# Patient Record
Sex: FEMALE | URBAN - METROPOLITAN AREA
[De-identification: names, ages, dates, MRNs, and addresses within clinical notes are randomized per-mention and may not be internally consistent; named-entity substitution may affect disease eponyms.]

---

## 2017-02-13 ENCOUNTER — APPOINTMENT (RX ONLY)
Dept: URBAN - METROPOLITAN AREA CLINIC 128 | Facility: CLINIC | Age: 67
Setting detail: DERMATOLOGY
End: 2017-02-13

## 2017-02-13 DIAGNOSIS — L82.0 INFLAMED SEBORRHEIC KERATOSIS: ICD-10-CM

## 2017-02-13 DIAGNOSIS — L82.1 OTHER SEBORRHEIC KERATOSIS: ICD-10-CM

## 2017-02-13 DIAGNOSIS — Z85.828 PERSONAL HISTORY OF OTHER MALIGNANT NEOPLASM OF SKIN: ICD-10-CM

## 2017-02-13 PROBLEM — L55.1 SUNBURN OF SECOND DEGREE: Status: ACTIVE | Noted: 2017-02-13

## 2017-02-13 PROCEDURE — ? COUNSELING

## 2017-02-13 PROCEDURE — 99203 OFFICE O/P NEW LOW 30 MIN: CPT | Mod: 25

## 2017-02-13 PROCEDURE — 17110 DESTRUCTION B9 LES UP TO 14: CPT

## 2017-02-13 PROCEDURE — ? LIQUID NITROGEN

## 2017-02-13 ASSESSMENT — LOCATION DETAILED DESCRIPTION DERM
LOCATION DETAILED: LEFT INFERIOR LATERAL MALAR CHEEK
LOCATION DETAILED: SUBXIPHOID

## 2017-02-13 ASSESSMENT — LOCATION ZONE DERM
LOCATION ZONE: FACE
LOCATION ZONE: TRUNK

## 2017-02-13 ASSESSMENT — LOCATION SIMPLE DESCRIPTION DERM
LOCATION SIMPLE: ABDOMEN
LOCATION SIMPLE: LEFT CHEEK

## 2017-02-13 NOTE — PROCEDURE: LIQUID NITROGEN
Post-Care Instructions: I reviewed with the patient in detail post-care instructions. Patient is to wear sunprotection, and avoid picking at any of the treated lesions. Pt may apply Vaseline to crusted or scabbing areas.
Medical Necessity Clause: Medically necessary to remove because
Include Z78.9 (Other Specified Conditions Influencing Health Status) As An Associated Diagnosis?: No
Medical Necessity Information: It is in your best interest to select a reason for this procedure from the list below. All of these items fulfill various CMS LCD requirements except the new and changing color options.
Number Of Freeze-Thaw Cycles: 2 freeze-thaw cycles
Duration Of Freeze Thaw-Cycle (Seconds): 0
Consent: The patient's consent was obtained including but not limited to risks of crusting, scabbing, blistering, scarring, darker or lighter pigmentary change, recurrence, incomplete removal and infection. Patient is aware that treatment may be denied by their insurance and has consented to continue with procedure.
Detail Level: Simple

## 2017-02-13 NOTE — PROCEDURE: MIPS QUALITY
Quality 130: Documentation Of Current Medications In The Medical Record: Current Medications Documented
Quality 134: Screening For Clinical Depression And Follow-Up Plan: The patient was screened for depression and the screen was negative and no follow up required
Quality 47: Advance Care Plan: Advance Care Planning discussed and documented; advance care plan or surrogate decision maker documented in the medical record.
Quality 154 Part A: Falls: Risk Assessment (Should Be Reported With Measure 155.): Falls risk assessment completed and documented in the past 12 months.
Quality 431: Preventive Care And Screening: Unhealthy Alcohol Use - Screening: Patient screened for unhealthy alcohol use using a single question and scores less than 2 times per year
Quality 111:Pneumonia Vaccination Status For Older Adults: Pneumococcal Vaccination Previously Received
Detail Level: Detailed
Quality 131: Pain Assessment And Follow-Up: Pain assessment documented as positive using a standardized tool AND a follow-up plan is documented
Quality 226: Preventive Care And Screening: Tobacco Use: Screening And Cessation Intervention: Patient screened for tobacco and never smoked
Quality 394b: Td/Tdap Immunizations For Adolescents: Patient had one tetanus, diphtheria toxoids and acellular pertussis vaccine (Tdap) on or between the patient's 10th and 13th birthdays.
Quality 110: Preventive Care And Screening: Influenza Immunization: Influenza Immunization Administered during Influenza season

## 2017-02-13 NOTE — HPI: EVALUATION OF SKIN LESION(S)
How Severe Are Your Spot(S)?: mild
Have Your Spot(S) Been Treated In The Past?: has not been treated
Hpi Title: Evaluation of Skin Lesions
Family Member: Brother

## 2017-03-03 ENCOUNTER — FOLLOW UP (OUTPATIENT)
Dept: URBAN - METROPOLITAN AREA CLINIC 33 | Facility: CLINIC | Age: 67
End: 2017-03-03

## 2017-03-03 VITALS
BODY MASS INDEX: 22.5 KG/M2 | DIASTOLIC BLOOD PRESSURE: 70 MMHG | HEIGHT: 66 IN | WEIGHT: 140 LBS | SYSTOLIC BLOOD PRESSURE: 97 MMHG | HEART RATE: 75 BPM

## 2017-03-03 DIAGNOSIS — H02.833: ICD-10-CM

## 2017-03-03 DIAGNOSIS — H04.123: ICD-10-CM

## 2017-03-03 DIAGNOSIS — H35.3132: ICD-10-CM

## 2017-03-03 DIAGNOSIS — H25.13: ICD-10-CM

## 2017-03-03 DIAGNOSIS — H02.836: ICD-10-CM

## 2017-03-03 DIAGNOSIS — H43.392: ICD-10-CM

## 2017-03-03 DIAGNOSIS — H43.811: ICD-10-CM

## 2017-03-03 PROCEDURE — G8420 CALC BMI NORM PARAMETERS: HCPCS

## 2017-03-03 PROCEDURE — 92235 FLUORESCEIN ANGRPH MLTIFRAME: CPT

## 2017-03-03 PROCEDURE — 92250 FUNDUS PHOTOGRAPHY W/I&R: CPT

## 2017-03-03 PROCEDURE — 92014 COMPRE OPH EXAM EST PT 1/>: CPT

## 2017-03-03 PROCEDURE — 2019F DILATED MACUL EXAM DONE: CPT

## 2017-03-03 PROCEDURE — 4177F TALK PT/CRGVR RE AREDS PREV: CPT

## 2017-03-03 PROCEDURE — 1036F TOBACCO NON-USER: CPT

## 2017-03-03 ASSESSMENT — TONOMETRY
OD_IOP_MMHG: 17
OS_IOP_MMHG: 14

## 2017-03-03 ASSESSMENT — VISUAL ACUITY
OD_SC: 20/20
OS_PH: 20/25-1
OS_SC: 20/80-1

## 2018-03-02 ENCOUNTER — FOLLOW UP (OUTPATIENT)
Dept: URBAN - METROPOLITAN AREA CLINIC 33 | Facility: CLINIC | Age: 68
End: 2018-03-02

## 2018-03-02 VITALS
SYSTOLIC BLOOD PRESSURE: 111 MMHG | WEIGHT: 142 LBS | HEART RATE: 75 BPM | BODY MASS INDEX: 22.82 KG/M2 | DIASTOLIC BLOOD PRESSURE: 69 MMHG | HEIGHT: 66 IN

## 2018-03-02 DIAGNOSIS — H43.392: ICD-10-CM

## 2018-03-02 DIAGNOSIS — H35.3132: ICD-10-CM

## 2018-03-02 DIAGNOSIS — H43.811: ICD-10-CM

## 2018-03-02 PROCEDURE — 92235 FLUORESCEIN ANGRPH MLTIFRAME: CPT

## 2018-03-02 PROCEDURE — 92250 FUNDUS PHOTOGRAPHY W/I&R: CPT

## 2018-03-02 PROCEDURE — 92014 COMPRE OPH EXAM EST PT 1/>: CPT

## 2018-03-02 ASSESSMENT — TONOMETRY
OS_IOP_MMHG: 14
OD_IOP_MMHG: 14

## 2018-03-02 ASSESSMENT — VISUAL ACUITY
OD_SC: 20/20-
OS_PH: 20/30-
OS_SC: 20/100

## 2018-07-18 NOTE — PATIENT DISCUSSION
POSSILBE OCULAR MIGRANE- REC. MONITOR, IF NOTICING ANY [facility] CHANGES , TOLD TO CALL OFFICE IMMEDIATELY. OFFER APPT WITH DR Niki Collins FOR THE STARRY TYPE IMAGE THAT PT IS EXPERIENCING.

## 2019-03-29 ENCOUNTER — FOLLOW UP (OUTPATIENT)
Dept: URBAN - METROPOLITAN AREA CLINIC 33 | Facility: CLINIC | Age: 69
End: 2019-03-29

## 2019-03-29 VITALS
HEIGHT: 66 IN | SYSTOLIC BLOOD PRESSURE: 109 MMHG | BODY MASS INDEX: 22.5 KG/M2 | DIASTOLIC BLOOD PRESSURE: 74 MMHG | WEIGHT: 140 LBS | HEART RATE: 71 BPM

## 2019-03-29 DIAGNOSIS — H43.811: ICD-10-CM

## 2019-03-29 DIAGNOSIS — H43.392: ICD-10-CM

## 2019-03-29 DIAGNOSIS — H35.3132: ICD-10-CM

## 2019-03-29 PROCEDURE — 92014 COMPRE OPH EXAM EST PT 1/>: CPT

## 2019-03-29 PROCEDURE — 92235 FLUORESCEIN ANGRPH MLTIFRAME: CPT

## 2019-03-29 PROCEDURE — 92250 FUNDUS PHOTOGRAPHY W/I&R: CPT

## 2019-03-29 PROCEDURE — 92134 CPTRZ OPH DX IMG PST SGM RTA: CPT

## 2019-03-29 ASSESSMENT — TONOMETRY
OS_IOP_MMHG: 14
OD_IOP_MMHG: 13

## 2019-03-29 ASSESSMENT — VISUAL ACUITY
OD_CC: 20/20-1
OS_CC: 20/25-2

## 2020-01-10 ENCOUNTER — UNSCHEDULED FOLLOW UP (OUTPATIENT)
Dept: URBAN - METROPOLITAN AREA CLINIC 33 | Facility: CLINIC | Age: 70
End: 2020-01-10

## 2020-01-10 VITALS
HEART RATE: 67 BPM | WEIGHT: 143 LBS | DIASTOLIC BLOOD PRESSURE: 82 MMHG | SYSTOLIC BLOOD PRESSURE: 119 MMHG | BODY MASS INDEX: 22.98 KG/M2 | HEIGHT: 66 IN

## 2020-01-10 DIAGNOSIS — H43.811: ICD-10-CM

## 2020-01-10 DIAGNOSIS — H02.833: ICD-10-CM

## 2020-01-10 DIAGNOSIS — H43.392: ICD-10-CM

## 2020-01-10 DIAGNOSIS — H35.3132: ICD-10-CM

## 2020-01-10 DIAGNOSIS — H02.836: ICD-10-CM

## 2020-01-10 DIAGNOSIS — H25.13: ICD-10-CM

## 2020-01-10 DIAGNOSIS — H04.123: ICD-10-CM

## 2020-01-10 PROCEDURE — 92235 FLUORESCEIN ANGRPH MLTIFRAME: CPT

## 2020-01-10 PROCEDURE — 92134 CPTRZ OPH DX IMG PST SGM RTA: CPT

## 2020-01-10 PROCEDURE — 92014 COMPRE OPH EXAM EST PT 1/>: CPT

## 2020-01-10 PROCEDURE — 92250 FUNDUS PHOTOGRAPHY W/I&R: CPT

## 2020-01-10 ASSESSMENT — VISUAL ACUITY
OD_CC: 20/20-1
OS_CC: 20/60-1

## 2020-01-10 ASSESSMENT — TONOMETRY
OD_IOP_MMHG: 16
OS_IOP_MMHG: 15

## 2021-01-08 ENCOUNTER — FOLLOW UP (OUTPATIENT)
Dept: URBAN - METROPOLITAN AREA CLINIC 33 | Facility: CLINIC | Age: 71
End: 2021-01-08

## 2021-01-08 VITALS
SYSTOLIC BLOOD PRESSURE: 121 MMHG | HEART RATE: 79 BPM | HEIGHT: 66 IN | BODY MASS INDEX: 22.5 KG/M2 | DIASTOLIC BLOOD PRESSURE: 80 MMHG | WEIGHT: 140 LBS

## 2021-01-08 DIAGNOSIS — H02.836: ICD-10-CM

## 2021-01-08 DIAGNOSIS — H35.3132: ICD-10-CM

## 2021-01-08 DIAGNOSIS — H43.811: ICD-10-CM

## 2021-01-08 DIAGNOSIS — H25.13: ICD-10-CM

## 2021-01-08 DIAGNOSIS — H43.392: ICD-10-CM

## 2021-01-08 DIAGNOSIS — H04.123: ICD-10-CM

## 2021-01-08 DIAGNOSIS — H02.833: ICD-10-CM

## 2021-01-08 PROCEDURE — 92134 CPTRZ OPH DX IMG PST SGM RTA: CPT

## 2021-01-08 PROCEDURE — 92250 FUNDUS PHOTOGRAPHY W/I&R: CPT

## 2021-01-08 PROCEDURE — 92014 COMPRE OPH EXAM EST PT 1/>: CPT

## 2021-01-08 PROCEDURE — 92235 FLUORESCEIN ANGRPH MLTIFRAME: CPT

## 2021-01-08 ASSESSMENT — TONOMETRY
OS_IOP_MMHG: 13
OD_IOP_MMHG: 12

## 2021-01-08 ASSESSMENT — VISUAL ACUITY
OD_CC: 20/20-2
OS_PH: 20/60-2
OS_CC: 20/80-2

## 2022-01-06 ENCOUNTER — FOLLOW UP (OUTPATIENT)
Dept: URBAN - METROPOLITAN AREA CLINIC 33 | Facility: CLINIC | Age: 72
End: 2022-01-06

## 2022-01-06 VITALS
DIASTOLIC BLOOD PRESSURE: 73 MMHG | HEIGHT: 65 IN | SYSTOLIC BLOOD PRESSURE: 119 MMHG | WEIGHT: 142 LBS | HEART RATE: 76 BPM | BODY MASS INDEX: 23.66 KG/M2

## 2022-01-06 DIAGNOSIS — H43.392: ICD-10-CM

## 2022-01-06 DIAGNOSIS — H25.13: ICD-10-CM

## 2022-01-06 DIAGNOSIS — H35.3132: ICD-10-CM

## 2022-01-06 DIAGNOSIS — H04.123: ICD-10-CM

## 2022-01-06 DIAGNOSIS — H43.811: ICD-10-CM

## 2022-01-06 PROCEDURE — 92235 FLUORESCEIN ANGRPH MLTIFRAME: CPT

## 2022-01-06 PROCEDURE — 92134 CPTRZ OPH DX IMG PST SGM RTA: CPT

## 2022-01-06 PROCEDURE — 92250 FUNDUS PHOTOGRAPHY W/I&R: CPT

## 2022-01-06 PROCEDURE — 92014 COMPRE OPH EXAM EST PT 1/>: CPT

## 2022-01-06 ASSESSMENT — VISUAL ACUITY
OS_CC: 20/70-1
OS_SC: 20/200-2
OD_SC: 20/20-1
OD_CC: 20/20

## 2022-01-06 ASSESSMENT — TONOMETRY
OD_IOP_MMHG: 13
OS_IOP_MMHG: 9

## 2024-03-07 ENCOUNTER — FOLLOW UP (OUTPATIENT)
Dept: URBAN - METROPOLITAN AREA CLINIC 33 | Facility: CLINIC | Age: 74
End: 2024-03-07

## 2024-03-07 DIAGNOSIS — H02.833: ICD-10-CM

## 2024-03-07 DIAGNOSIS — H04.123: ICD-10-CM

## 2024-03-07 DIAGNOSIS — H02.836: ICD-10-CM

## 2024-03-07 DIAGNOSIS — H35.3123: ICD-10-CM

## 2024-03-07 DIAGNOSIS — H25.13: ICD-10-CM

## 2024-03-07 DIAGNOSIS — H43.392: ICD-10-CM

## 2024-03-07 DIAGNOSIS — H43.811: ICD-10-CM

## 2024-03-07 DIAGNOSIS — H35.3132: ICD-10-CM

## 2024-03-07 PROCEDURE — 92134 CPTRZ OPH DX IMG PST SGM RTA: CPT

## 2024-03-07 PROCEDURE — 92014 COMPRE OPH EXAM EST PT 1/>: CPT

## 2024-03-07 PROCEDURE — 92235 FLUORESCEIN ANGRPH MLTIFRAME: CPT

## 2024-03-07 PROCEDURE — 92250 FUNDUS PHOTOGRAPHY W/I&R: CPT

## 2024-03-07 ASSESSMENT — TONOMETRY
OD_IOP_MMHG: 12
OS_IOP_MMHG: 12

## 2024-03-07 ASSESSMENT — VISUAL ACUITY
OD_SC: 20/30-2
OS_SC: 20/200-1

## 2025-02-06 ENCOUNTER — FOLLOW UP (OUTPATIENT)
Age: 75
End: 2025-02-06

## 2025-02-06 VITALS — WEIGHT: 141 LBS | HEIGHT: 66 IN | BODY MASS INDEX: 22.66 KG/M2

## 2025-02-06 DIAGNOSIS — H02.836: ICD-10-CM

## 2025-02-06 DIAGNOSIS — H35.3123: ICD-10-CM

## 2025-02-06 DIAGNOSIS — H43.811: ICD-10-CM

## 2025-02-06 DIAGNOSIS — H25.13: ICD-10-CM

## 2025-02-06 DIAGNOSIS — H02.833: ICD-10-CM

## 2025-02-06 DIAGNOSIS — H43.392: ICD-10-CM

## 2025-02-06 DIAGNOSIS — H04.123: ICD-10-CM

## 2025-02-06 DIAGNOSIS — H35.3112: ICD-10-CM

## 2025-02-06 PROCEDURE — 92250 FUNDUS PHOTOGRAPHY W/I&R: CPT | Mod: 59

## 2025-02-06 PROCEDURE — 92134 CPTRZ OPH DX IMG PST SGM RTA: CPT

## 2025-02-06 PROCEDURE — 92014 COMPRE OPH EXAM EST PT 1/>: CPT
